# Patient Record
Sex: FEMALE | Race: WHITE | NOT HISPANIC OR LATINO | URBAN - METROPOLITAN AREA
[De-identification: names, ages, dates, MRNs, and addresses within clinical notes are randomized per-mention and may not be internally consistent; named-entity substitution may affect disease eponyms.]

---

## 2018-12-02 ENCOUNTER — OFFICE VISIT (OUTPATIENT)
Dept: URGENT CARE | Age: 23
End: 2018-12-02
Payer: COMMERCIAL

## 2018-12-02 VITALS
RESPIRATION RATE: 16 BRPM | OXYGEN SATURATION: 97 % | WEIGHT: 115 LBS | HEART RATE: 98 BPM | SYSTOLIC BLOOD PRESSURE: 104 MMHG | BODY MASS INDEX: 18.05 KG/M2 | DIASTOLIC BLOOD PRESSURE: 70 MMHG | HEIGHT: 67 IN | TEMPERATURE: 99.7 F

## 2018-12-02 DIAGNOSIS — R51.9 NONINTRACTABLE HEADACHE, UNSPECIFIED CHRONICITY PATTERN, UNSPECIFIED HEADACHE TYPE: Primary | ICD-10-CM

## 2018-12-02 PROCEDURE — G0383 LEV 4 HOSP TYPE B ED VISIT: HCPCS | Performed by: PHYSICIAN ASSISTANT

## 2018-12-02 PROCEDURE — 99284 EMERGENCY DEPT VISIT MOD MDM: CPT | Performed by: PHYSICIAN ASSISTANT

## 2018-12-02 RX ORDER — MEDROXYPROGESTERONE ACETATE 150 MG/ML
150 INJECTION, SUSPENSION INTRAMUSCULAR
COMMUNITY

## 2018-12-02 RX ORDER — LEVOTHYROXINE SODIUM 0.05 MG/1
50 TABLET ORAL DAILY
COMMUNITY

## 2023-10-10 NOTE — PROGRESS NOTES
[email or web address]        NAME: Horace Hackett is a 26 y o  female  : 1995    MRN: [id number]  DATE: 2018  TIME: 4:29 PM    Assessment and Plan   Nonintractable headache, unspecified chronicity pattern, unspecified headache type [R51]  1  Nonintractable headache, unspecified chronicity pattern, unspecified headache type  Transfer to other facility         Patient Instructions       Follow up with PCP in 3-5 days  Proceed to  ER if symptoms worsen  Chief Complaint     Chief Complaint   Patient presents with    Headache     4 days; nausea since last night  chills; ear fullness         History of Present Illness       46-year-old female presents with severe headache  Patient reports symptoms started 4 days ago  Has had severe headaches since then as progressively getting worse  No history of migraines  Has been feeling fevers and chills for the past couple days which is worsening  Patient reports that bright lights make the headache worse  Also any movement or jaw ring makes it severe  Reports ports as a pounding throbbing headache that is severe  Also having pain in posterior neck  Reports that she feels as though she has a neck brace on a can't turn her head  Also pain is radiating down her spine  Been having nausea  No vomiting or diarrhea  No chest pain  Migraine    This is a new problem  The current episode started in the past 7 days  The problem has been gradually worsening  The pain is located in the bilateral region  The pain radiates to the upper back  The pain quality is not similar to prior headaches  The quality of the pain is described as throbbing and pulsating  The pain is at a severity of 10/10  The pain is severe  Associated symptoms include back pain, blurred vision, dizziness, a fever, a loss of balance, muscle aches, nausea, neck pain, photophobia, scalp tenderness and weakness   Pertinent negatives include no abdominal pain, abnormal behavior, anorexia, coughing, drainage, ear pain, eye pain, eye redness, eye watering, facial sweating or vomiting  The symptoms are aggravated by activity, bright light and coughing  Treatments tried: Aspirin  The treatment provided no relief  Review of Systems   Review of Systems   Constitutional: Positive for activity change, chills, fatigue and fever  HENT: Negative  Negative for ear pain  Eyes: Positive for blurred vision and photophobia  Negative for pain and redness  Respiratory: Negative  Negative for cough  Cardiovascular: Negative  Gastrointestinal: Positive for nausea  Negative for abdominal pain, anorexia and vomiting  Musculoskeletal: Positive for back pain and neck pain  Skin: Negative  Neurological: Positive for dizziness, weakness, light-headedness and loss of balance  Current Medications       Current Outpatient Prescriptions:     levothyroxine 50 mcg tablet, Take 50 mcg by mouth daily, Disp: , Rfl:     medroxyPROGESTERone (DEPO-PROVERA) 150 mg/mL injection, Inject 150 mg into a muscle every 3 (three) months, Disp: , Rfl:     Current Allergies     Allergies as of 12/02/2018    (No Known Allergies)            The following portions of the patient's history were reviewed and updated as appropriate: allergies, current medications, past family history, past medical history, past social history, past surgical history and problem list      No past medical history on file  No past surgical history on file  No family history on file  Medications have been verified  Objective   /70   Pulse 98   Temp 99 7 °F (37 6 °C)   Resp 16   Ht 5' 7" (1 702 m)   Wt 52 2 kg (115 lb)   SpO2 97%   BMI 18 01 kg/m²        Physical Exam     Physical Exam   Constitutional: She is oriented to person, place, and time  She appears well-developed and well-nourished  She has a sickly appearance  She appears ill  She appears distressed  HENT:   Head: Normocephalic and atraumatic  Right Ear: External ear normal    Left Ear: External ear normal    Nose: Nose normal    Mouth/Throat: Oropharynx is clear and moist  No oropharyngeal exudate  Eyes: Pupils are equal, round, and reactive to light  Conjunctivae and EOM are normal  Right eye exhibits no discharge  Left eye exhibits no discharge  Neck: Spinous process tenderness and muscular tenderness present  Neck rigidity present  Decreased range of motion (Unable to afford flex due to pain and posterior neck) present  Cardiovascular: Normal rate, regular rhythm, normal heart sounds and intact distal pulses  No murmur heard  Pulmonary/Chest: Effort normal and breath sounds normal  No respiratory distress  She has no wheezes  She has no rales  Abdominal: Soft  Bowel sounds are normal  There is no tenderness  Lymphadenopathy:     She has no cervical adenopathy  Neurological: She is alert and oriented to person, place, and time  She has normal strength  She displays normal reflexes  No cranial nerve deficit or sensory deficit  GCS eye subscore is 4  GCS verbal subscore is 5  GCS motor subscore is 6  Skin: Skin is warm and dry  Psychiatric: She has a normal mood and affect  Nursing note and vitals reviewed  none